# Patient Record
Sex: MALE | Race: WHITE | Employment: UNEMPLOYED | URBAN - METROPOLITAN AREA
[De-identification: names, ages, dates, MRNs, and addresses within clinical notes are randomized per-mention and may not be internally consistent; named-entity substitution may affect disease eponyms.]

---

## 2018-07-21 ENCOUNTER — HOSPITAL ENCOUNTER (EMERGENCY)
Facility: CLINIC | Age: 23
Discharge: HOME | End: 2018-07-21
Attending: EMERGENCY MEDICINE | Admitting: EMERGENCY MEDICINE
Payer: COMMERCIAL

## 2018-07-21 VITALS
RESPIRATION RATE: 16 BRPM | DIASTOLIC BLOOD PRESSURE: 55 MMHG | TEMPERATURE: 95.8 F | SYSTOLIC BLOOD PRESSURE: 105 MMHG | OXYGEN SATURATION: 99 % | HEART RATE: 80 BPM

## 2018-07-21 DIAGNOSIS — F10.129 HANGOVER (H): ICD-10-CM

## 2018-07-21 DIAGNOSIS — R11.10 VOMITING, INTRACTABILITY OF VOMITING NOT SPECIFIED, PRESENCE OF NAUSEA NOT SPECIFIED, UNSPECIFIED VOMITING TYPE: ICD-10-CM

## 2018-07-21 DIAGNOSIS — F10.220 ACUTE ALCOHOLIC INTOXICATION IN ALCOHOLISM WITHOUT COMPLICATION (H): ICD-10-CM

## 2018-07-21 LAB — ALCOHOL BREATH TEST: 0.16 (ref 0–0.01)

## 2018-07-21 PROCEDURE — 99285 EMERGENCY DEPT VISIT HI MDM: CPT

## 2018-07-21 PROCEDURE — 82075 ASSAY OF BREATH ETHANOL: CPT

## 2018-07-21 PROCEDURE — 99283 EMERGENCY DEPT VISIT LOW MDM: CPT | Mod: Z6 | Performed by: EMERGENCY MEDICINE

## 2018-07-21 NOTE — ED AVS SNAPSHOT
[facility], [place], Emergency Department    [address]    [facility] [address]    Phone:  [phone]    Fax:  [phone]                                       Lily Guillen   MRN: [id number]    Department:  [facility], Emergency Department   Date of Visit:  7/21/2018           After Visit Summary Signature Page     I have received my discharge instructions, and my questions have been answered. I have discussed any challenges I see with this plan with the nurse or doctor.    ..........................................................................................................................................  Patient/Patient Representative Signature      ..........................................................................................................................................  Patient Representative Print Name and Relationship to Patient    ..................................................               ................................................  Date                                            Time    ..........................................................................................................................................  Reviewed by Signature/Title    ...................................................              ..............................................  Date                                                            Time

## 2018-07-21 NOTE — DISCHARGE INSTRUCTIONS
Please avoid drinking to the point of severe intoxication.    Return to the emergency department for any worsening back pain, abdominal pain, fevers or chills, burning with urination, nausea or vomiting, weakness or any other concerns as given or discussed.    If you wish to receive detox, you can call to reserve a bed at [phone].  If you feel you are withdrawing from alcohol, come to the emergency department immediately.           Alcohol Abuse  Alcoholic drinks are harmful when you have too many of them. Drinking that disrupts your life is called alcohol abuse. Alcohol abuse can hurt your relationships with others. You may lose friends, a spouse, or even your job. You may be abusing alcohol if any of the following are true for you:    Duties at home or with  suffer because of drinking.    Duties at work or in school suffer because of drinking.    You have missed work or school because of drinking.    You use alcohol while driving or operating machinery.    You have legal problems such as arrests due to drinking.    You keep drinking even though it causes serious problems in your life.  Alcohol abuse can cause health problems. Too much alcohol can cause disease of the liver and pancreas. It can damage your brain and your heart. It raises your risk of cancer. It can lead to sexual problems and make it hard to conceive children. Alcohol abuse in pregnancy can hurt the baby. Alcohol affects how you think and respond. You are more likely to die in an accident or fire if you have been drinking.  Home Care    Admit you have a problem with alcohol.    Ask for help from your healthcare provider as well as trusted family members or close friends.    Get help from people trained in dealing with alcohol abuse. This may be individual counseling or group therapy, or it may be a supervised alcohol treatment program.    Join a self-help group for alcohol abuse such as Alcoholics Anonymous (AA).    Avoid people who  abuse alcohol or tempt you to drink.  Follow Up  as advised by the doctor or our staff. Contact these groups to get help:    Alcoholics Anonymous (AA): Go to www.aa.org or check the phone book for meetings near you.    National Alcohol and Substance Abuse Information Center (NASAIC): 907.960.9439 www.addictioncareS.E.A. Medical Systems.[employer]    National [place] on Alcoholism and Drug Dependence (NCADD): 529-LKN-GSLE (844-9809) www.ncadd.org    Al-Anon: 542-8TD-KBEH (785-7155) www.al-anon.org  Get Prompt Medical Attention  if you have:    Confusion    Hallucinations (seeing, hearing, or feeling things that aren t there)    Extreme drowsiness or inability to awaken    Increasing upper abdominal pain    Repeated vomiting, vomiting blood, or black or tarry stools    Severe shakiness or fast heart rate    Seizure (convulsion)    9425-5284 [employer]. [address] [place], [place] [address]. All rights reserved. This information is not intended as a substitute for professional medical care. Always follow your healthcare professional's instructions.

## 2018-07-21 NOTE — ED PROVIDER NOTES
History     Chief Complaint   Patient presents with     Alcohol Intoxication     Per EMS, patient was out with roommate at bar. Per EMS, patient had 8 beers and began to vomit. Patient asked roommate to call 911     HPI  Lily Guillen is a 23 year old male drinking with a friend tonight acutely began to vomit EMS called and brought patient to the emergency department.  Patient was afraid as well as his roommate that he would vomit in his sleep at home.    In the ED and evaluation he is quite somnolent but easily arousable to verbal stimulation.  Denies other drugs only alcohol.  Denies any chest pain or shortness of breath.  Denies any trauma.      I have reviewed the Medications, Allergies, Past Medical and Surgical History, and Social History in the Epic system.    Review of Systems   14 point review of symptoms was performed and is negative except as noted above.     Physical Exam   BP: 113/61  Heart Rate: 89  Temp: 97  F (36.1  C)  Resp: 18  SpO2: 100 %      Physical Exam  GEN: Somnolent and intoxicated cooperative and conversant.   HEENT: The head is normocephalic and atraumatic. Pupils are equal round and reactive to light. Extraocular motions are intact. There is no facial swelling. The neck is nontender and supple.   CV: Regular rate and rhythm without murmurs rubs or gallops. 2+ radial pulses bilaterally.  PULM: Clear to auscultation bilaterally.  Appropriate respiratory effort.  ABD: Soft, nontender, nondistended.   EXT: Full range of motion.  No edema.  NEURO: Cranial nerves II through XII are intact and symmetric. Bilateral upper and lower extremities grossly show full range of motion without any focal deficits.   SKIN: No rashes, ecchymosis, or lacerations  PSYCH: Calm and cooperative, interactive.     ED Course     ED Course     Procedures               Labs Ordered and Resulted from Time of ED Arrival Up to the Time of Departure from the ED   ALCOHOL BREATH TEST POCT - Abnormal; Notable for the  following:        Result Value    Alcohol Breath Test 0.16 (*)     All other components within normal limits   DRUG ABUSE SCREEN 6 CHEM DEP URINE ([facility])            Assessments & Plan (with Medical Decision Making)   23-year-old male presenting for AMS, broad ddx considered as documented    AMS etiology most likely related to excessive etoh consumption and intoxication.  Coingestions are possible.    DDx also inlcudes: other substance ingestion, meningitis, encephalitis, ICH, seizure and post ictal state, infection, uremia, among others. Given known etoh ingestion, history, benign examination, I believe these are unlikely.     Patient will be watched carefully with frequent neuro checks in the ED.  Anticipate pt will sober and improve. A change form a continuous improvement pattern will prompt re-evaluation and further workup.     He is accompanied by friend, however likely this individual also consumed alcohol and is therefore not an appropriate chaperone for the patient    On reevaluation at 6:40am, patient is awake and sitting up at the edge of the stretcher.  He endorses alcohol use and again denies any other drug use.  He is oriented to and alert ×4.  He was able to eat and drink food and water, and was clinically sober ambulating without difficulty.  Alcohol use and abuse discussed    Appropriate for discharge and outpatient follow-up as needed    - Patient agrees to our plan and is ready and eager for discharge. Care plan, follow up plan, and reasons to return immediately to the ED were dicussed in detail and summarized as noted in the discharge instructions.          I have reviewed the nursing notes.    I have reviewed the findings, diagnosis, plan and need for follow up with the patient.    New Prescriptions    No medications on file       Final diagnoses:   Acute alcoholic intoxication in alcoholism without complication (H)   Vomiting, intractability of vomiting not specified, presence of nausea not  specified, unspecified vomiting type       7/21/2018   [facility], [place], EMERGENCY DEPARTMENT     Mike Sanhcez MD  07/21/18 0607

## 2018-07-21 NOTE — ED NOTES
Bed: ED17  Expected date:   Expected time:   Means of arrival:   Comments:  Rafael 89  42 yo M  ETOH   ETA 5 min

## 2018-07-21 NOTE — ED AVS SNAPSHOT
[facility], Emergency Department    [address]    [place] [address]    Phone:  [phone]    Fax:  [phone]                                       Lily Guillen   MRN: [id number]    Department:  [facility], Emergency Department   Date of Visit:  7/21/2018           Patient Information     Date Of Birth          1995        Your diagnoses for this visit were:     Acute alcoholic intoxication in alcoholism without complication (H)     Vomiting, intractability of vomiting not specified, presence of nausea not specified, unspecified vomiting type        You were seen by Mike Sanchez MD.        Discharge Instructions       Please avoid drinking to the point of severe intoxication.    Return to the emergency department for any worsening back pain, abdominal pain, fevers or chills, burning with urination, nausea or vomiting, weakness or any other concerns as given or discussed.    If you wish to receive detox, you can call to reserve a bed at [phone].  If you feel you are withdrawing from alcohol, come to the emergency department immediately.           Alcohol Abuse  Alcoholic drinks are harmful when you have too many of them. Drinking that disrupts your life is called alcohol abuse. Alcohol abuse can hurt your relationships with others. You may lose friends, a spouse, or even your job. You may be abusing alcohol if any of the following are true for you:    Duties at home or with  suffer because of drinking.    Duties at work or in school suffer because of drinking.    You have missed work or school because of drinking.    You use alcohol while driving or operating machinery.    You have legal problems such as arrests due to drinking.    You keep drinking even though it causes serious problems in your life.  Alcohol abuse can cause health problems. Too much alcohol can cause disease of the liver and pancreas. It can damage your brain and your heart. It raises your risk  of cancer. It can lead to sexual problems and make it hard to conceive children. Alcohol abuse in pregnancy can hurt the baby. Alcohol affects how you think and respond. You are more likely to die in an accident or fire if you have been drinking.  Home Care    Admit you have a problem with alcohol.    Ask for help from your healthcare provider as well as trusted family members or close friends.    Get help from people trained in dealing with alcohol abuse. This may be individual counseling or group therapy, or it may be a supervised alcohol treatment program.    Join a self-help group for alcohol abuse such as Alcoholics Anonymous (AA).    Avoid people who abuse alcohol or tempt you to drink.  Follow Up  as advised by the doctor or our staff. Contact these groups to get help:    Alcoholics Anonymous (AA): Go to www.aa.org or check the phone book for meetings near you.    National Alcohol and Substance Abuse Information Center (NASAIC): 326.275.5665 www.addictioncareWattbot.[employer]    National [place] on Alcoholism and Drug Dependence (NCADD): 769-OLY-IPZK (553-9426) www.ncadd.org    Al-Anon: 167-7JG-QHZJ (582-4259) www.al-anon.org  Get Prompt Medical Attention  if you have:    Confusion    Hallucinations (seeing, hearing, or feeling things that aren t there)    Extreme drowsiness or inability to awaken    Increasing upper abdominal pain    Repeated vomiting, vomiting blood, or black or tarry stools    Severe shakiness or fast heart rate    Seizure (convulsion)    0703-5955 [employer]. [address] [place] [address], [place] [address]. All rights reserved. This information is not intended as a substitute for professional medical care. Always follow your healthcare professional's instructions.              24 Hour Appointment Hotline       To make an appointment at any [place] clinic, call [phone] ([phone]). If you don't have a family doctor or clinic, we will help you find one. [facility] are  "conveniently located to serve the needs of you and your family.             Review of your medicines      Notice     You have not been prescribed any medications.            Procedures and tests performed during your visit     Alcohol breath test POCT      Orders Needing Specimen Collection     Ordered          07/21/18 0256  Drug abuse screen 6 urine (tox) - STAT, [place]: STAT, Needs to be Collected     Scheduled Task Status   07/21/18 0257 Collect Drug abuse screen 6 urine (tox) Open   Order Class:  PCU Collect                  Pending Results     No orders found from 7/19/2018 to 7/22/2018.            Pending Culture Results     No orders found from 7/19/2018 to 7/22/2018.            Pending Results Instructions     If you had any lab results that were not finalized at the time of your Discharge, you can call the ED Lab Result RN at [phone]. You will be contacted by this team for any positive Lab results or changes in treatment. The nurses are available 7 days a week from 10A to 6:30P.  You can leave a message 24 hours per day and they will return your call.        Thank you for choosing [facility]       Thank you for choosing [facility] for your care. Our goal is always to provide you with excellent care. Hearing back from our patients is one way we can continue to improve our services. Please take a few minutes to complete the written survey that you may receive in the mail after you visit with us. Thank you!        [employer] Information     [email or web address] lets you send messages to your doctor, view your test results, renew your prescriptions, schedule appointments and more. To sign up, go to [email or web address] . Click on \"Log in\" on the left side of the screen, which will take you to the Welcome page. Then click on \"Sign up Now\" on the right side of the page.     You will be asked to enter the access code listed below, as well as some personal information. Please follow the directions to create your username and " password.     Your access code is: [email or web address]  Expires: 10/19/2018  7:12 AM     Your access code will  in 90 days. If you need help or a new code, please call your [place] clinic or [phone].        Care EveryWhere ID     This is your Care EveryWhere ID. This could be used by other organizations to access your [facility] medical records  [id number]        Equal Access to Services     HOUST[place] OLIVARES : Hadkamini parisio Soherberth, waaxda luqadaha, qaybta kaalmada adeegyada, phyllis thompson . So [facility] [phone].    ATENCIÓN: Si habla español, tiene a atkinson disposición servicios gratuitos de asistencia lingüística. Llame al [phone].    We comply with applicable federal civil rights laws and [place] laws. We do not discriminate on the basis of race, color, national origin, age, disability, sex, sexual orientation, or gender identity.            After Visit Summary       This is your record. Keep this with you and show to your community pharmacist(s) and doctor(s) at your next visit.